# Patient Record
Sex: FEMALE | Race: WHITE | NOT HISPANIC OR LATINO | Employment: FULL TIME | URBAN - METROPOLITAN AREA
[De-identification: names, ages, dates, MRNs, and addresses within clinical notes are randomized per-mention and may not be internally consistent; named-entity substitution may affect disease eponyms.]

---

## 2019-03-11 ENCOUNTER — OFFICE VISIT (OUTPATIENT)
Dept: URGENT CARE | Facility: CLINIC | Age: 39
End: 2019-03-11
Payer: COMMERCIAL

## 2019-03-11 VITALS
SYSTOLIC BLOOD PRESSURE: 110 MMHG | OXYGEN SATURATION: 100 % | RESPIRATION RATE: 16 BRPM | HEIGHT: 65 IN | WEIGHT: 170 LBS | DIASTOLIC BLOOD PRESSURE: 80 MMHG | TEMPERATURE: 98.4 F | HEART RATE: 65 BPM | BODY MASS INDEX: 28.32 KG/M2

## 2019-03-11 DIAGNOSIS — M62.838 MUSCLE SPASMS OF NECK: ICD-10-CM

## 2019-03-11 PROCEDURE — 99203 OFFICE O/P NEW LOW 30 MIN: CPT | Performed by: INTERNAL MEDICINE

## 2019-03-11 RX ORDER — METAXALONE 800 MG/1
800 TABLET ORAL 3 TIMES DAILY
Qty: 21 TAB | Refills: 0 | Status: SHIPPED | OUTPATIENT
Start: 2019-03-11 | End: 2019-03-11 | Stop reason: CLARIF

## 2019-03-11 RX ORDER — CYCLOBENZAPRINE HCL 5 MG
5 TABLET ORAL 3 TIMES DAILY PRN
Qty: 9 TAB | Refills: 0 | Status: SHIPPED | OUTPATIENT
Start: 2019-03-11 | End: 2019-03-14

## 2019-03-11 ASSESSMENT — ENCOUNTER SYMPTOMS
FALLS: 0
DIARRHEA: 0
NECK PAIN: 1
EYES NEGATIVE: 1
CONSTITUTIONAL NEGATIVE: 1
BLOOD IN STOOL: 0
FEVER: 0
SORE THROAT: 0
PSYCHIATRIC NEGATIVE: 1
DIZZINESS: 0
CHILLS: 0
HEADACHES: 0
NAUSEA: 0
BACK PAIN: 0
COUGH: 0
PALPITATIONS: 0
VOMITING: 0
SHORTNESS OF BREATH: 0
MYALGIAS: 0
WEIGHT LOSS: 0

## 2019-03-11 NOTE — PROGRESS NOTES
Marci Valle is a 38 y.o. female who presents for Neck Pain (Patient says she had a migraine 03/8/19, Hard to move to the right side,x3 days )       Patient is a 38-year-old female who presents today with right-sided neck pain and spasm.  Patient states she has history of migraines and had a migraine last week however as of Saturday she awoke with severe muscle spasm and tightness of her right side of her neck.  Patient denies any photophobia no nuchal rigidity no evidence of any type of meningitis.  The patient states she does not have any numbness or tingling on her right arm.  States it difficult for her to turn her neck especially to the right.  Patient denies any other symptoms.  No fever shakes or chills.  No nausea vomiting or diarrhea.  No other complaints.        PMH:  has no past medical history on file.  MEDS:   Current Outpatient Prescriptions:   •  metaxalone (SKELAXIN) 800 MG Tab, Take 1 Tab by mouth 3 times a day for 7 days., Disp: 21 Tab, Rfl: 0  ALLERGIES:   Allergies   Allergen Reactions   • Aspirin    • Codeine    • Lortab      SURGHX: History reviewed. No pertinent surgical history.  SOCHX:  reports that she has never smoked. She has never used smokeless tobacco. She reports that she drinks alcohol. She reports that she does not use drugs.  FH: Family history was reviewed, no pertinent findings to report    Review of Systems   Constitutional: Negative.  Negative for chills, fever and weight loss.   HENT: Negative for sore throat.    Eyes: Negative.    Respiratory: Negative for cough and shortness of breath.    Cardiovascular: Negative for chest pain, palpitations and leg swelling.   Gastrointestinal: Negative for blood in stool, diarrhea, nausea and vomiting.   Genitourinary: Negative for dysuria.   Musculoskeletal: Positive for neck pain. Negative for back pain, falls, joint pain and myalgias.   Skin: Negative for rash.   Neurological: Negative for dizziness (negative headache) and  "headaches.   Psychiatric/Behavioral: Negative.      Allergies   Allergen Reactions   • Aspirin    • Codeine    • Lortab       Objective:   /80 (BP Location: Right arm, Patient Position: Sitting)   Pulse 65   Temp 36.9 °C (98.4 °F) (Temporal)   Resp 16   Ht 1.651 m (5' 5\")   Wt 77.1 kg (170 lb)   SpO2 100%   BMI 28.29 kg/m²   Physical Exam   Constitutional: She is oriented to person, place, and time. She appears well-developed and well-nourished. She is active. No distress.   HENT:   Head: Normocephalic and atraumatic.   Right Ear: External ear normal.   Left Ear: External ear normal.   Mouth/Throat: Oropharynx is clear and moist. No oropharyngeal exudate.   Eyes: Pupils are equal, round, and reactive to light. Conjunctivae and EOM are normal. Right eye exhibits no discharge.   Neck: Normal range of motion. Neck supple. Carotid bruit is not present. No thyroid mass present.   Cardiovascular: Normal rate, regular rhythm, S1 normal, S2 normal and normal heart sounds.  Exam reveals no friction rub.    No murmur heard.  Pulmonary/Chest: Effort normal and breath sounds normal. No respiratory distress. She has no wheezes.   Abdominal: There is no hepatosplenomegaly.   Musculoskeletal:        Cervical back: She exhibits decreased range of motion, tenderness, pain and spasm. She exhibits no bony tenderness, no swelling and no edema.        Back:    Lymphadenopathy:        Head (right side): No submental, no submandibular and no occipital adenopathy present.        Head (left side): No submental, no submandibular and no occipital adenopathy present.   Neurological: She is alert and oriented to person, place, and time. She has normal strength.   Skin: Skin is warm and dry.   Psychiatric: She has a normal mood and affect. Her behavior is normal. Judgment and thought content normal.   Nursing note and vitals reviewed.        Assessment/Plan:   Assessment    1. Muscle spasms of neck  - metaxalone (SKELAXIN) 800 MG " Tab; Take 1 Tab by mouth 3 times a day for 7 days.  Dispense: 21 Tab; Refill: 0  Differential diagnosis, natural history, supportive care, and indications for immediate follow-up discussed.  Patient can use over-the-counter anti-inflammatories, she states she is taking these with no problem in the past.  Patient get a heating pad.  Patient advised to do some stretching.  Patient to follow-up ER for any worsening symptoms.  Patient to follow-up with her primary care doctor

## 2019-03-30 ENCOUNTER — HOSPITAL ENCOUNTER (EMERGENCY)
Facility: MEDICAL CENTER | Age: 39
End: 2019-03-30
Attending: EMERGENCY MEDICINE
Payer: COMMERCIAL

## 2019-03-30 VITALS
RESPIRATION RATE: 16 BRPM | BODY MASS INDEX: 29.48 KG/M2 | DIASTOLIC BLOOD PRESSURE: 75 MMHG | TEMPERATURE: 96.9 F | WEIGHT: 183.42 LBS | HEIGHT: 66 IN | HEART RATE: 72 BPM | OXYGEN SATURATION: 99 % | SYSTOLIC BLOOD PRESSURE: 121 MMHG

## 2019-03-30 DIAGNOSIS — J06.9 UPPER RESPIRATORY TRACT INFECTION, UNSPECIFIED TYPE: ICD-10-CM

## 2019-03-30 PROCEDURE — 99282 EMERGENCY DEPT VISIT SF MDM: CPT

## 2019-03-30 RX ORDER — GUAIFENESIN AND DEXTROMETHORPHAN HYDROBROMIDE 100; 10 MG/5ML; MG/5ML
10 SOLUTION ORAL EVERY 6 HOURS PRN
Qty: 1 BOTTLE | Refills: 0 | Status: SHIPPED | OUTPATIENT
Start: 2019-03-30

## 2019-03-30 NOTE — ED NOTES
Discharge instructions went through with patient, told to return to ED for new or concerning symptoms

## 2019-03-30 NOTE — ED PROVIDER NOTES
ED Provider Note    Scribed for Aden Barth M.D. by Kailey Meyers. 3/30/2019  7:51 AM    Primary care provider: Pcp Pt States None  Means of arrival: Walk-in  History obtained from: Patient  History limited by: None    CHIEF COMPLAINT  Chief Complaint   Patient presents with   • Flu Like Symptoms     x 3 days. Fever chills, cough, sore throat. VSS.  sick with same symptoms.        HPI  Marci Valle is a 38 y.o. female who presents to the Emergency Department complaining of cough onset three days ago. The patient reports additional symptoms of yellow sputum production, chills, hoarse voice, and mild sore throat. The patient affirms a fever last night which has since resolved. She has had recent sick contact with her  who is has been sick with symptoms of a cough and sore throat for the past three weeks. The patient denies any nausea or vomiting. She denies any relevant medical history. She has no history of tobacco abuse. She has a medical allergy to Codeine. No other medical concerns at this time.    REVIEW OF SYSTEMS  Pertinent positives include cough, yellow sputum production, chills, hoarse voice, fever(resolved) and mild sore throat.  Pertinent negatives include no nausea or vomiting  See HPI for further details.       SURGICAL HISTORY  patient denies any surgical history    SOCIAL HISTORY  Social History   Substance Use Topics   • Smoking status: Never Smoker   • Smokeless tobacco: Never Used   • Alcohol use Yes      Comment: Occasionally       History   Drug Use No       FAMILY HISTORY  History reviewed. No pertinent family history.    CURRENT MEDICATIONS  Home Medications     Reviewed by Rosi Ocasio R.N. (Registered Nurse) on 03/30/19 at 0710  Med List Status: Complete   Medication Last Dose Status        Patient Jordan Taking any Medications                       ALLERGIES  Allergies   Allergen Reactions   • Aspirin    • Codeine    • Lortab        PHYSICAL EXAM  VITAL SIGNS: BP  "121/75   Pulse 72   Temp 36.1 °C (96.9 °F) (Temporal)   Resp 16   Ht 1.676 m (5' 6\")   Wt 83.2 kg (183 lb 6.8 oz)   SpO2 99%   BMI 29.61 kg/m²     Nursing note and vitals reviewed.  Constitutional: Well-developed and well-nourished. No distress.   HENT: Head is normocephalic and atraumatic. Oropharynx is clear and moist without exudate or erythema. Nasal congestion. Hoarse voice. Rhinorrhea.    Eyes: Pupils are equal, round, and reactive to light. Conjunctiva are normal.   Cardiovascular: Normal rate and regular rhythm. No murmur heard. Normal radial pulses.  Pulmonary/Chest: Breath sounds normal. No wheezes or rales.   Abdominal: Soft and non-tender. No distention    Musculoskeletal: Extremities exhibit normal range of motion without edema or tenderness.   Neurological: Awake, alert and oriented to person, place, and time. No focal deficits noted.  Skin: Skin is warm and dry. No rash.   Psychiatric: Normal mood and affect. Appropriate for clinical situation.    COURSE & MEDICAL DECISION MAKING    Nursing notes, VS, PMSFHx reviewed in chart.     7:51 AM - Patient seen and examined at bedside. The patient presents today with signs and symptoms consistent with a viral upper respiratory infection. They have a normal pulse oximetry on room air and a normal pulmonary exam. Therefore, I feel that the likelihood of pneumonia is low. This patient does not demonstrate any clinical evidence of pneumonia, meningitis, appendicitis, or other acute medical emergency. Overall, the patient is very well appearing. I do not feel that this patient would benefit from antibiotics at this time. I have recommended Tylenol and/or ibuprofen for fever.   Patient appears to have a viral illness. I informed the patient of my plan to discharge home with Robitussin to take every 6 hours for her symptoms. Patient verbalizes understanding and agreement     The patient will return for new or worsening symptoms and is stable at the time of " discharge.    DISPOSITION:  Patient will be discharged home in stable condition.    FOLLOW UP:  [facility], Emergency Dept  [address] [facility] [address] [phone]  Schedule an appointment as soon as possible for a visit         OUTPATIENT MEDICATIONS:  Discharge Medication List as of 3/30/2019  8:06 AM      START taking these medications    Details   Dextromethorphan-Guaifenesin (ROBITUSSIN DM)  MG/5ML Syrup Take 10 mL by mouth every 6 hours as needed., Disp-1 Bottle, R-0, Print Rx Paper               FINAL IMPRESSION  1. Upper respiratory tract infection, unspecified type          I, Kailey Meyers (Scribe), am scribing for, and in the presence of, Aden Barth M.D..    Electronically signed by: Kailey Meyers (Scribe), 3/30/2019    IAden M.D. personally performed the services described in this documentation, as scribed by Kailey Meyers in my presence, and it is both accurate and complete.    The note accurately reflects work and decisions made by me.  Aden Barth  3/30/2019  11:02 AM

## 2019-03-30 NOTE — ED TRIAGE NOTES
Marci GUERRA Valle  38 y.o. female  Chief Complaint   Patient presents with   • Flu Like Symptoms     x 3 days. Fever chills, cough, sore throat. VSS.  sick with same symptoms.        Pt amb to triage with steady gait for above complaint. Pt states she has been taking OTC Day quil and Night quil with little relief.   Pt is alert and oriented, speaking in full sentences, follows commands and responds appropriately to questions. NAD. Resp are even and unlabored.  Pt placed in lobby. Pt educated on triage process. Pt encouraged to alert staff for any changes.

## 2021-10-30 ENCOUNTER — TELEPHONE (OUTPATIENT)
Dept: SCHEDULING | Facility: IMAGING CENTER | Age: 41
End: 2021-10-30